# Patient Record
Sex: FEMALE | URBAN - NONMETROPOLITAN AREA
[De-identification: names, ages, dates, MRNs, and addresses within clinical notes are randomized per-mention and may not be internally consistent; named-entity substitution may affect disease eponyms.]

---

## 2021-08-26 ENCOUNTER — OFFICE VISIT (OUTPATIENT)
Dept: URBAN - NONMETROPOLITAN AREA CLINIC 2 | Facility: CLINIC | Age: 51
End: 2021-08-26

## 2021-11-18 ENCOUNTER — OFFICE VISIT (OUTPATIENT)
Dept: URBAN - NONMETROPOLITAN AREA CLINIC 2 | Facility: CLINIC | Age: 51
End: 2021-11-18

## 2022-06-06 ENCOUNTER — OFFICE VISIT (OUTPATIENT)
Dept: URBAN - NONMETROPOLITAN AREA CLINIC 2 | Facility: CLINIC | Age: 52
End: 2022-06-06

## 2022-09-27 ENCOUNTER — OFFICE VISIT (OUTPATIENT)
Dept: URBAN - NONMETROPOLITAN AREA CLINIC 2 | Facility: CLINIC | Age: 52
End: 2022-09-27
Payer: COMMERCIAL

## 2022-09-27 VITALS
HEIGHT: 62 IN | WEIGHT: 132 LBS | TEMPERATURE: 97.5 F | SYSTOLIC BLOOD PRESSURE: 126 MMHG | HEART RATE: 61 BPM | BODY MASS INDEX: 24.29 KG/M2 | DIASTOLIC BLOOD PRESSURE: 89 MMHG

## 2022-09-27 DIAGNOSIS — Z12.11 COLON CANCER SCREENING: ICD-10-CM

## 2022-09-27 DIAGNOSIS — K59.09 CHANGE IN BOWEL MOVEMENTS INTERMITTENT CONSTIPATION. URGENCY IN THE MORNING.: ICD-10-CM

## 2022-09-27 PROCEDURE — 99204 OFFICE O/P NEW MOD 45 MIN: CPT | Performed by: INTERNAL MEDICINE

## 2022-09-27 PROCEDURE — 99244 OFF/OP CNSLTJ NEW/EST MOD 40: CPT | Performed by: INTERNAL MEDICINE

## 2022-09-27 RX ORDER — LINACLOTIDE 290 UG/1
1 CAPSULE AT LEAST 30 MINUTES BEFORE A MEAL ON AN EMPTY STOMACH CAPSULE, GELATIN COATED ORAL ONCE A DAY
Qty: 90 | Refills: 3 | OUTPATIENT
Start: 2022-09-27 | End: 2023-09-22

## 2022-09-27 NOTE — HPI-TODAY'S VISIT:
9/27/2022 Sita is a 52-year-old female who is referred to me by Dr. Ata Leger for consultation of constipation.  A copy of this note be sent to the referring physician.  She states in 2019 she had a colonoscopy by Dr. Sheikh with 1 tubular adenoma otherwise normal.  She states that she has had lifelong alternating constipation and diarrhea, recently over the past several years its been more constipation.  She is tried multiple over-the-counter modalities including stool softeners, Metamucil, MiraLAX with inconsistent relief.  She denies any rectal bleeding or mucus however the other day she does describe a rectal spasm.  She denies any weight loss.  Her mom has significant irritable bowel syndrome followed at [place] with fecal incontinence.  She states that she is always have the opposite problem.  Today she agrees to labs, we will obtain her colonoscopy done by Dr. Sheikh, we will try a trial of Linzess and consider alternative therapy pending her labs and her response.  MB

## 2022-09-28 ENCOUNTER — TELEPHONE ENCOUNTER (OUTPATIENT)
Dept: URBAN - METROPOLITAN AREA CLINIC 92 | Facility: CLINIC | Age: 52
End: 2022-09-28

## 2022-09-28 LAB
A/G RATIO: 1.8
ABSOLUTE BASOPHILS: 30
ABSOLUTE EOSINOPHILS: 138
ABSOLUTE LYMPHOCYTES: 1286
ABSOLUTE MONOCYTES: 258
ABSOLUTE NEUTROPHILS: 2589
ALBUMIN: 4.4
ALKALINE PHOSPHATASE: 65
ALT (SGPT): 20
AST (SGOT): 23
BASOPHILS: 0.7
BILIRUBIN, TOTAL: 0.6
BUN/CREATININE RATIO: 13
BUN: 14
C-REACTIVE PROTEIN, QUANT: 1.8
CALCIUM: 9.6
CARBON DIOXIDE, TOTAL: 27
CHLORIDE: 104
CREATININE: 1.04
EGFR: 64
EOSINOPHILS: 3.2
GLOBULIN, TOTAL: 2.4
GLUCOSE: 90
HEMATOCRIT: 44.6
HEMOGLOBIN: 15.4
IMMUNOGLOBULIN A: 274
INTERPRETATION: (no result)
LYMPHOCYTES: 29.9
MCH: 31.4
MCHC: 34.5
MCV: 90.8
MONOCYTES: 6
MPV: 11.4
NEUTROPHILS: 60.2
PLATELET COUNT: 241
POTASSIUM: 4.4
PROTEIN, TOTAL: 6.8
RDW: 12.5
RED BLOOD CELL COUNT: 4.91
SED RATE BY MODIFIED: 2
SODIUM: 141
TISSUE TRANSGLUTAMINASE AB, IGA: <1
TSH: 1.97
WHITE BLOOD CELL COUNT: 4.3

## 2023-02-22 ENCOUNTER — WEB ENCOUNTER (OUTPATIENT)
Dept: URBAN - NONMETROPOLITAN AREA CLINIC 2 | Facility: CLINIC | Age: 53
End: 2023-02-22

## 2023-02-28 ENCOUNTER — WEB ENCOUNTER (OUTPATIENT)
Dept: URBAN - NONMETROPOLITAN AREA CLINIC 2 | Facility: CLINIC | Age: 53
End: 2023-02-28

## 2023-02-28 ENCOUNTER — OFFICE VISIT (OUTPATIENT)
Dept: URBAN - NONMETROPOLITAN AREA CLINIC 2 | Facility: CLINIC | Age: 53
End: 2023-02-28
Payer: COMMERCIAL

## 2023-02-28 VITALS
HEIGHT: 62 IN | HEART RATE: 73 BPM | WEIGHT: 131 LBS | DIASTOLIC BLOOD PRESSURE: 82 MMHG | BODY MASS INDEX: 24.11 KG/M2 | SYSTOLIC BLOOD PRESSURE: 132 MMHG | TEMPERATURE: 97.8 F

## 2023-02-28 DIAGNOSIS — Z12.11 COLON CANCER SCREENING: ICD-10-CM

## 2023-02-28 DIAGNOSIS — R10.31 RLQ ABDOMINAL PAIN: ICD-10-CM

## 2023-02-28 DIAGNOSIS — K59.01 SLOW TRANSIT CONSTIPATION: ICD-10-CM

## 2023-02-28 PROCEDURE — 99214 OFFICE O/P EST MOD 30 MIN: CPT | Performed by: NURSE PRACTITIONER

## 2023-02-28 RX ORDER — LINACLOTIDE 290 UG/1
1 CAPSULE AT LEAST 30 MINUTES BEFORE A MEAL ON AN EMPTY STOMACH CAPSULE, GELATIN COATED ORAL ONCE A DAY
Qty: 90 | Refills: 3 | Status: ACTIVE | COMMUNITY
Start: 2022-09-27 | End: 2023-09-22

## 2023-02-28 RX ORDER — LINACLOTIDE 145 UG/1
1 CAPSULE AT LEAST 30 MINUTES BEFORE THE FIRST MEAL OF THE DAY ON AN EMPTY STOMACH CAPSULE, GELATIN COATED ORAL ONCE A DAY
Qty: 30 | OUTPATIENT
Start: 2023-02-28 | End: 2023-03-30

## 2023-02-28 NOTE — HPI-TODAY'S VISIT:
9/27/2022 Sita is a 52-year-old female who is referred to me by Dr. Ata Leger for consultation of constipation.  A copy of this note be sent to the referring physician.  She states in 2019 she had a colonoscopy by Dr. Sheikh with 1 tubular adenoma otherwise normal.  She states that she has had lifelong alternating constipation and diarrhea, recently over the past several years its been more constipation.  She is tried multiple over-the-counter modalities including stool softeners, Metamucil, MiraLAX with inconsistent relief.  She denies any rectal bleeding or mucus however the other day she does describe a rectal spasm.  She denies any weight loss.  Her mom has significant irritable bowel syndrome followed at [place] with fecal incontinence.  She states that she is always have the opposite problem.  Today she agrees to labs, we will obtain her colonoscopy done by Dr. Sheikh, we will try a trial of Linzess and consider alternative therapy pending her labs and her response.  MB 2/28/2023 Sita presents for follow-up of constipation.  Since her last visit she did not get her Linzess prescription.  She increased her dietary fiber intake with some improvement.  She still going several days without a bowel movement.  In January she woke up with severe right lower quadrant abdominal pain and vomiting.  She went to the emergency department had a contrasted CT scan that was normal.  Her lipase was mildly elevated, normal ranges from 11-82, hers is 85.  She had no evidence of pancreatitis on imaging.  Today her main complaint is constipation, she agrees to try the Linzess, Rx resent.  MB

## 2023-02-28 NOTE — PHYSICAL EXAM MUSCULOSKELETAL:
normal gait and station , full range of motion Documentation clarification is required for compliance, accuracy in coding and billing, claim validation and reporting severity of illness, quality data and risk of mortality.

## 2023-03-01 ENCOUNTER — WEB ENCOUNTER (OUTPATIENT)
Dept: URBAN - NONMETROPOLITAN AREA CLINIC 2 | Facility: CLINIC | Age: 53
End: 2023-03-01

## 2023-03-01 ENCOUNTER — TELEPHONE ENCOUNTER (OUTPATIENT)
Dept: URBAN - METROPOLITAN AREA CLINIC 92 | Facility: CLINIC | Age: 53
End: 2023-03-01

## 2023-03-01 LAB — LIPASE: 80

## 2023-03-01 RX ORDER — LINACLOTIDE 145 UG/1
1 CAPSULE AT LEAST 30 MINUTES BEFORE THE FIRST MEAL OF THE DAY ON AN EMPTY STOMACH CAPSULE, GELATIN COATED ORAL ONCE A DAY
Qty: 30
Start: 2023-02-28 | End: 2023-04-01

## 2023-03-14 ENCOUNTER — WEB ENCOUNTER (OUTPATIENT)
Dept: URBAN - NONMETROPOLITAN AREA CLINIC 2 | Facility: CLINIC | Age: 53
End: 2023-03-14

## 2023-08-22 ENCOUNTER — WEB ENCOUNTER (OUTPATIENT)
Dept: URBAN - NONMETROPOLITAN AREA CLINIC 2 | Facility: CLINIC | Age: 53
End: 2023-08-22

## 2023-08-29 ENCOUNTER — OFFICE VISIT (OUTPATIENT)
Dept: URBAN - NONMETROPOLITAN AREA CLINIC 2 | Facility: CLINIC | Age: 53
End: 2023-08-29
Payer: COMMERCIAL

## 2023-08-29 ENCOUNTER — DASHBOARD ENCOUNTERS (OUTPATIENT)
Age: 53
End: 2023-08-29

## 2023-08-29 ENCOUNTER — LAB OUTSIDE AN ENCOUNTER (OUTPATIENT)
Dept: URBAN - NONMETROPOLITAN AREA CLINIC 2 | Facility: CLINIC | Age: 53
End: 2023-08-29

## 2023-08-29 VITALS
TEMPERATURE: 98.1 F | SYSTOLIC BLOOD PRESSURE: 123 MMHG | DIASTOLIC BLOOD PRESSURE: 81 MMHG | BODY MASS INDEX: 25.76 KG/M2 | HEIGHT: 62 IN | HEART RATE: 71 BPM | WEIGHT: 140 LBS

## 2023-08-29 DIAGNOSIS — K59.01 SLOW TRANSIT CONSTIPATION: ICD-10-CM

## 2023-08-29 DIAGNOSIS — Z12.11 COLON CANCER SCREENING: ICD-10-CM

## 2023-08-29 DIAGNOSIS — R63.5 WEIGHT GAIN: ICD-10-CM

## 2023-08-29 DIAGNOSIS — K62.5 RECTAL BLEEDING: ICD-10-CM

## 2023-08-29 DIAGNOSIS — R10.31 RLQ ABDOMINAL PAIN: ICD-10-CM

## 2023-08-29 PROBLEM — 12063002: Status: ACTIVE | Noted: 2023-08-22

## 2023-08-29 PROBLEM — 35298007: Status: ACTIVE | Noted: 2022-09-27

## 2023-08-29 PROBLEM — 301754002: Status: ACTIVE | Noted: 2023-02-28

## 2023-08-29 PROBLEM — 305058001: Status: ACTIVE | Noted: 2022-09-27

## 2023-08-29 PROBLEM — 8943002: Status: ACTIVE | Noted: 2023-08-29

## 2023-08-29 PROCEDURE — 99214 OFFICE O/P EST MOD 30 MIN: CPT | Performed by: NURSE PRACTITIONER

## 2023-08-29 RX ORDER — LINACLOTIDE 290 UG/1
1 CAPSULE AT LEAST 30 MINUTES BEFORE A MEAL ON AN EMPTY STOMACH CAPSULE, GELATIN COATED ORAL ONCE A DAY
Qty: 90 | Refills: 3 | Status: ACTIVE | COMMUNITY
Start: 2022-09-27 | End: 2023-09-22

## 2023-08-29 RX ORDER — SODIUM, POTASSIUM,MAG SULFATES 17.5-3.13G
177 MILLILITER SOLUTION, RECONSTITUTED, ORAL ORAL
Qty: 1 UNSPECIFIED | Refills: 0 | OUTPATIENT
Start: 2023-08-29 | End: 2023-08-30

## 2023-08-29 NOTE — HPI-TODAY'S VISIT:
9/27/2022 Sita is a 52-year-old female who is referred to me by Dr. Ata Leger for consultation of constipation.  A copy of this note be sent to the referring physician.  She states in 2019 she had a colonoscopy by Dr. Sheikh with 1 tubular adenoma otherwise normal.  She states that she has had lifelong alternating constipation and diarrhea, recently over the past several years its been more constipation.  She is tried multiple over-the-counter modalities including stool softeners, Metamucil, MiraLAX with inconsistent relief.  She denies any rectal bleeding or mucus however the other day she does describe a rectal spasm.  She denies any weight loss.  Her mom has significant irritable bowel syndrome followed at [place] with fecal incontinence.  She states that she is always have the opposite problem.  Today she agrees to labs, we will obtain her colonoscopy done by Dr. Sheikh, we will try a trial of Linzess and consider alternative therapy pending her labs and her response.  MB 2/28/2023 Sita presents for follow-up of constipation.  Since her last visit she did not get her Linzess prescription.  She increased her dietary fiber intake with some improvement.  She still going several days without a bowel movement.  In January she woke up with severe right lower quadrant abdominal pain and vomiting.  She went to the emergency department had a contrasted CT scan that was normal.  Her lipase was mildly elevated, normal ranges from 11-82, hers is 85.  She had no evidence of pancreatitis on imaging.  Today her main complaint is constipation, she agrees to try the Linzess, Rx resent.  MB 8/29/2023 Sita presents for evaluation of acute diarrhea and rectal bleeding.  Last weekend she had I will Emely and subsequently developed acute watery diarrhea with bright red blood per rectum.  Her last colonoscopy was in 2019 by Dr. Sheikh with 1 tubular adenoma.  She is due for repeat next bring.  Her diarrhea has resolved with no further rectal bleeding.  She is struggling with weight gain despite extensive exercise.  She has been counting her calories.  She would like to meet with a nutritionist.  Today her bowels have normalized, I suspect she contracted an acute infectious illness.  We will schedule colonoscopy given her rectal bleeding.  MB

## 2023-12-08 ENCOUNTER — OFFICE VISIT (OUTPATIENT)
Dept: URBAN - NONMETROPOLITAN AREA SURGERY CENTER 1 | Facility: SURGERY CENTER | Age: 53
End: 2023-12-08

## 2024-08-05 ENCOUNTER — WEB ENCOUNTER (OUTPATIENT)
Dept: URBAN - NONMETROPOLITAN AREA CLINIC 2 | Facility: CLINIC | Age: 54
End: 2024-08-05

## 2024-08-07 ENCOUNTER — LAB OUTSIDE AN ENCOUNTER (OUTPATIENT)
Dept: URBAN - NONMETROPOLITAN AREA SURGERY CENTER 1 | Facility: SURGERY CENTER | Age: 54
End: 2024-08-07

## 2024-08-15 ENCOUNTER — CLAIMS CREATED FROM THE CLAIM WINDOW (OUTPATIENT)
Dept: URBAN - NONMETROPOLITAN AREA SURGERY CENTER 1 | Facility: SURGERY CENTER | Age: 54
End: 2024-08-15
Payer: COMMERCIAL

## 2024-08-15 ENCOUNTER — CLAIMS CREATED FROM THE CLAIM WINDOW (OUTPATIENT)
Dept: URBAN - METROPOLITAN AREA CLINIC 4 | Facility: CLINIC | Age: 54
End: 2024-08-15
Payer: COMMERCIAL

## 2024-08-15 DIAGNOSIS — D12.2 ADENOMA OF ASCENDING COLON: ICD-10-CM

## 2024-08-15 DIAGNOSIS — Z86.010 PERSONAL HISTORY OF COLON POLYPS: ICD-10-CM

## 2024-08-15 DIAGNOSIS — Z09 ENCOUNTER FOR COLONOSCOPY FOLLOWING COLON POLYP REMOVAL: ICD-10-CM

## 2024-08-15 DIAGNOSIS — Z86.010 ADENOMAS PERSONAL HISTORY OF COLONIC POLYPS: ICD-10-CM

## 2024-08-15 DIAGNOSIS — D12.2 BENIGN NEOPLASM OF ASCENDING COLON: ICD-10-CM

## 2024-08-15 PROCEDURE — 0529F INTRVL 3/>YR PTS CLNSCP DOCD: CPT | Performed by: INTERNAL MEDICINE

## 2024-08-15 PROCEDURE — 45385 COLONOSCOPY W/LESION REMOVAL: CPT | Performed by: INTERNAL MEDICINE

## 2024-08-15 PROCEDURE — 88305 TISSUE EXAM BY PATHOLOGIST: CPT | Performed by: PATHOLOGY

## 2024-08-15 PROCEDURE — 00812 ANES LWR INTST SCR COLSC: CPT | Performed by: NURSE ANESTHETIST, CERTIFIED REGISTERED

## 2025-06-30 ENCOUNTER — WEB ENCOUNTER (OUTPATIENT)
Dept: URBAN - NONMETROPOLITAN AREA CLINIC 2 | Facility: CLINIC | Age: 55
End: 2025-06-30

## 2025-07-07 ENCOUNTER — LAB OUTSIDE AN ENCOUNTER (OUTPATIENT)
Dept: URBAN - NONMETROPOLITAN AREA CLINIC 2 | Facility: CLINIC | Age: 55
End: 2025-07-07

## 2025-07-07 ENCOUNTER — OFFICE VISIT (OUTPATIENT)
Dept: URBAN - NONMETROPOLITAN AREA CLINIC 2 | Facility: CLINIC | Age: 55
End: 2025-07-07
Payer: COMMERCIAL

## 2025-07-07 DIAGNOSIS — Z12.11 COLON CANCER SCREENING: ICD-10-CM

## 2025-07-07 DIAGNOSIS — K62.5 RECTAL BLEEDING: ICD-10-CM

## 2025-07-07 DIAGNOSIS — R11.2 NAUSEA AND VOMITING, UNSPECIFIED VOMITING TYPE: ICD-10-CM

## 2025-07-07 DIAGNOSIS — K59.04 CHRONIC IDIOPATHIC CONSTIPATION: ICD-10-CM

## 2025-07-07 DIAGNOSIS — R10.84 GENERALIZED ABDOMINAL PAIN: ICD-10-CM

## 2025-07-07 PROBLEM — 82934008: Status: ACTIVE | Noted: 2025-07-07

## 2025-07-07 PROBLEM — 16932000: Status: ACTIVE | Noted: 2025-07-07

## 2025-07-07 PROBLEM — 102614006: Status: ACTIVE | Noted: 2025-07-07

## 2025-07-07 PROCEDURE — 99214 OFFICE O/P EST MOD 30 MIN: CPT | Performed by: NURSE PRACTITIONER

## 2025-07-07 RX ORDER — PLECANATIDE 3 MG/1
1 TABLET TABLET ORAL ONCE A DAY
Qty: 90 TABLET | Refills: 3 | OUTPATIENT
Start: 2025-07-07 | End: 2026-07-02

## 2025-07-07 RX ORDER — FAMOTIDINE 40 MG/1
1 TABLET TABLET, FILM COATED ORAL ONCE A DAY
Qty: 30 | OUTPATIENT
Start: 2025-07-07

## 2025-07-07 RX ORDER — DICYCLOMINE HYDROCHLORIDE 10 MG/1
1 CAPSULE CAPSULE ORAL
Qty: 60 | Refills: 3 | OUTPATIENT
Start: 2025-07-07

## 2025-07-07 RX ORDER — ONDANSETRON 4 MG/1
1 TABLET ON THE TONGUE AND ALLOW TO DISSOLVE TABLET, ORALLY DISINTEGRATING ORAL
Qty: 30 TABLET | Refills: 0 | OUTPATIENT
Start: 2025-07-07

## 2025-07-07 NOTE — HPI-TODAY'S VISIT:
9/27/2022 Sita is a 52-year-old female who is referred to me by Dr. Ata Leger for consultation of constipation.  A copy of this note be sent to the referring physician.  She states in 2019 she had a colonoscopy by Dr. Sheikh with 1 tubular adenoma otherwise normal.  She states that she has had lifelong alternating constipation and diarrhea, recently over the past several years its been more constipation.  She is tried multiple over-the-counter modalities including stool softeners, Metamucil, MiraLAX with inconsistent relief.  She denies any rectal bleeding or mucus however the other day she does describe a rectal spasm.  She denies any weight loss.  Her mom has significant irritable bowel syndrome followed at [place] with fecal incontinence.  She states that she is always have the opposite problem.  Today she agrees to labs, we will obtain her colonoscopy done by Dr. Sheikh, we will try a trial of Linzess and consider alternative therapy pending her labs and her response.  MB 2/28/2023 Sita presents for follow-up of constipation.  Since her last visit she did not get her Linzess prescription.  She increased her dietary fiber intake with some improvement.  She still going several days without a bowel movement.  In January she woke up with severe right lower quadrant abdominal pain and vomiting.  She went to the emergency department had a contrasted CT scan that was normal.  Her lipase was mildly elevated, normal ranges from 11-82, hers is 85.  She had no evidence of pancreatitis on imaging.  Today her main complaint is constipation, she agrees to try the Linzess, Rx resent.  MB 8/29/2023 Sita presents for evaluation of acute diarrhea and rectal bleeding.  Last weekend she had I will Emely and subsequently developed acute watery diarrhea with bright red blood per rectum.  Her last colonoscopy was in 2019 by Dr. Sheikh with 1 tubular adenoma.  She is due for repeat next bring.  Her diarrhea has resolved with no further rectal bleeding.  She is struggling with weight gain despite extensive exercise.  She has been counting her calories.  She would like to meet with a nutritionist.  Today her bowels have normalized, I suspect she contracted an acute infectious illness.  We will schedule colonoscopy given her rectal bleeding.  MB 7/7/2025 Sita presents for follow-up with history of constipation and new onset worsening generalized abdominal pain with acute episodes that wake her from sleep with nausea and vomiting and a baseline of constipation.  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  She has these intermittent acute episodes of abdominal pain that are generalized that wake her from sleep and then she will have constipation with explosive diarrhea nausea and vomiting.  Her colonoscopy in 2024 shows 1 tubular adenoma.  She has not had an upper endoscopy in the recent past.  She does still have her gallbladder.  She does notice eating heavy meals seems to make the symptoms worse.  Her last attack was in late June and she stopped all alcohol.  Today she agrees to labs and imaging.  Nelia call in dicyclomine and Zofran for the acute episodes of abdominal cramping with nausea vomiting.  I want her to start famotidine 40 mg at bedtime we will schedule upper endoscopy to rule out peptic ulcer disease along with ultrasound the gallbladder to rule out biliary etiology.  MB

## 2025-07-08 LAB
A/G RATIO: 2
ABSOLUTE BASOPHILS: 40
ABSOLUTE EOSINOPHILS: 132
ABSOLUTE LYMPHOCYTES: 1245
ABSOLUTE MONOCYTES: 365
ABSOLUTE NEUTROPHILS: 2618
ALBUMIN: 4.6
ALKALINE PHOSPHATASE: 60
ALT (SGPT): 17
AST (SGOT): 23
BASOPHILS: 0.9
BILIRUBIN, TOTAL: 0.5
BUN/CREATININE RATIO: (no result)
BUN: 17
C-REACTIVE PROTEIN, QUANT: <3
CALCIUM: 9.4
CARBON DIOXIDE, TOTAL: 26
CHLORIDE: 104
CREATININE: 0.91
EGFR: 74
EOSINOPHILS: 3
GLOBULIN, TOTAL: 2.3
GLUCOSE: 96
HEMATOCRIT: 44.6
HEMOGLOBIN: 14.4
LIPASE: 70
LYMPHOCYTES: 28.3
MCH: 31.7
MCHC: 32.3
MCV: 98.2
MONOCYTES: 8.3
MPV: 11.1
NEUTROPHILS: 59.5
PLATELET COUNT: 233
POTASSIUM: 4.5
PROTEIN, TOTAL: 6.9
RDW: 12.7
RED BLOOD CELL COUNT: 4.54
SED RATE BY MODIFIED: 2
SODIUM: 139
TSH W/REFLEX TO FT4: 1.44
WHITE BLOOD CELL COUNT: 4.4

## 2025-07-09 ENCOUNTER — LAB OUTSIDE AN ENCOUNTER (OUTPATIENT)
Dept: URBAN - NONMETROPOLITAN AREA CLINIC 2 | Facility: CLINIC | Age: 55
End: 2025-07-09

## 2025-07-09 ENCOUNTER — TELEPHONE ENCOUNTER (OUTPATIENT)
Dept: URBAN - NONMETROPOLITAN AREA CLINIC 2 | Facility: CLINIC | Age: 55
End: 2025-07-09

## 2025-07-10 ENCOUNTER — WEB ENCOUNTER (OUTPATIENT)
Dept: URBAN - NONMETROPOLITAN AREA CLINIC 2 | Facility: CLINIC | Age: 55
End: 2025-07-10

## 2025-07-14 ENCOUNTER — WEB ENCOUNTER (OUTPATIENT)
Dept: URBAN - NONMETROPOLITAN AREA CLINIC 2 | Facility: CLINIC | Age: 55
End: 2025-07-14

## 2025-07-23 ENCOUNTER — WEB ENCOUNTER (OUTPATIENT)
Dept: URBAN - NONMETROPOLITAN AREA CLINIC 2 | Facility: CLINIC | Age: 55
End: 2025-07-23

## 2025-07-24 ENCOUNTER — WEB ENCOUNTER (OUTPATIENT)
Dept: URBAN - NONMETROPOLITAN AREA CLINIC 2 | Facility: CLINIC | Age: 55
End: 2025-07-24

## 2025-07-29 ENCOUNTER — ERX REFILL RESPONSE (OUTPATIENT)
Dept: URBAN - NONMETROPOLITAN AREA CLINIC 2 | Facility: CLINIC | Age: 55
End: 2025-07-29

## 2025-07-29 RX ORDER — DICYCLOMINE HYDROCHLORIDE 10 MG/1
TAKE 1 CAPSULE BY MOUTH EVERY 6 HOURS AS NEEDED FOR CRAMPING CAPSULE ORAL
Qty: 360 CAPSULE | Refills: 1 | OUTPATIENT

## 2025-08-14 ENCOUNTER — ERX REFILL RESPONSE (OUTPATIENT)
Dept: URBAN - NONMETROPOLITAN AREA CLINIC 2 | Facility: CLINIC | Age: 55
End: 2025-08-14

## 2025-08-14 RX ORDER — FAMOTIDINE 40 MG/1
1 TABLET TABLET, FILM COATED ORAL ONCE A DAY
Qty: 90 TABLET | Refills: 3 | OUTPATIENT